# Patient Record
Sex: MALE | Race: WHITE | URBAN - METROPOLITAN AREA
[De-identification: names, ages, dates, MRNs, and addresses within clinical notes are randomized per-mention and may not be internally consistent; named-entity substitution may affect disease eponyms.]

---

## 2024-02-13 ENCOUNTER — LAB ENCOUNTER (OUTPATIENT)
Dept: LAB | Age: 24
End: 2024-02-13
Attending: INTERNAL MEDICINE
Payer: COMMERCIAL

## 2024-02-13 ENCOUNTER — OFFICE VISIT (OUTPATIENT)
Dept: INTERNAL MEDICINE CLINIC | Facility: CLINIC | Age: 24
End: 2024-02-13
Payer: COMMERCIAL

## 2024-02-13 VITALS
OXYGEN SATURATION: 98 % | TEMPERATURE: 97 F | HEIGHT: 68 IN | SYSTOLIC BLOOD PRESSURE: 110 MMHG | WEIGHT: 198 LBS | HEART RATE: 75 BPM | DIASTOLIC BLOOD PRESSURE: 68 MMHG | BODY MASS INDEX: 30.01 KG/M2

## 2024-02-13 DIAGNOSIS — Z00.00 PHYSICAL EXAM: ICD-10-CM

## 2024-02-13 DIAGNOSIS — Z00.00 PHYSICAL EXAM: Primary | ICD-10-CM

## 2024-02-13 DIAGNOSIS — M51.9 LUMBAR DISC DISEASE: ICD-10-CM

## 2024-02-13 LAB
ALBUMIN SERPL-MCNC: 4.7 G/DL (ref 3.2–4.8)
ALBUMIN/GLOB SERPL: 1.7 {RATIO} (ref 1–2)
ALP LIVER SERPL-CCNC: 82 U/L
ALT SERPL-CCNC: 29 U/L
ANION GAP SERPL CALC-SCNC: 6 MMOL/L (ref 0–18)
AST SERPL-CCNC: 16 U/L (ref ?–34)
BASOPHILS # BLD AUTO: 0.01 X10(3) UL (ref 0–0.2)
BASOPHILS NFR BLD AUTO: 0.2 %
BILIRUB SERPL-MCNC: 0.5 MG/DL (ref 0.3–1.2)
BILIRUB UR QL: NEGATIVE
BUN BLD-MCNC: 14 MG/DL (ref 9–23)
BUN/CREAT SERPL: 14.4 (ref 10–20)
CALCIUM BLD-MCNC: 9.7 MG/DL (ref 8.7–10.4)
CHLORIDE SERPL-SCNC: 107 MMOL/L (ref 98–112)
CHOLEST SERPL-MCNC: 188 MG/DL (ref ?–200)
CO2 SERPL-SCNC: 29 MMOL/L (ref 21–32)
COLOR UR: YELLOW
CREAT BLD-MCNC: 0.97 MG/DL
DEPRECATED RDW RBC AUTO: 38.9 FL (ref 35.1–46.3)
EGFRCR SERPLBLD CKD-EPI 2021: 112 ML/MIN/1.73M2 (ref 60–?)
EOSINOPHIL # BLD AUTO: 0.08 X10(3) UL (ref 0–0.7)
EOSINOPHIL NFR BLD AUTO: 1.6 %
ERYTHROCYTE [DISTWIDTH] IN BLOOD BY AUTOMATED COUNT: 12.5 % (ref 11–15)
FASTING PATIENT LIPID ANSWER: YES
FASTING STATUS PATIENT QL REPORTED: YES
GLOBULIN PLAS-MCNC: 2.8 G/DL (ref 2.8–4.4)
GLUCOSE BLD-MCNC: 96 MG/DL (ref 70–99)
GLUCOSE UR-MCNC: NORMAL MG/DL
HCT VFR BLD AUTO: 48.8 %
HDLC SERPL-MCNC: 47 MG/DL (ref 40–59)
HGB BLD-MCNC: 16.3 G/DL
HGB UR QL STRIP.AUTO: NEGATIVE
IMM GRANULOCYTES # BLD AUTO: 0.03 X10(3) UL (ref 0–1)
IMM GRANULOCYTES NFR BLD: 0.6 %
KETONES UR-MCNC: NEGATIVE MG/DL
LDLC SERPL CALC-MCNC: 109 MG/DL (ref ?–100)
LEUKOCYTE ESTERASE UR QL STRIP.AUTO: NEGATIVE
LYMPHOCYTES # BLD AUTO: 1.92 X10(3) UL (ref 1–4)
LYMPHOCYTES NFR BLD AUTO: 38.2 %
MCH RBC QN AUTO: 28.7 PG (ref 26–34)
MCHC RBC AUTO-ENTMCNC: 33.4 G/DL (ref 31–37)
MCV RBC AUTO: 86.1 FL
MONOCYTES # BLD AUTO: 0.3 X10(3) UL (ref 0.1–1)
MONOCYTES NFR BLD AUTO: 6 %
NEUTROPHILS # BLD AUTO: 2.69 X10 (3) UL (ref 1.5–7.7)
NEUTROPHILS # BLD AUTO: 2.69 X10(3) UL (ref 1.5–7.7)
NEUTROPHILS NFR BLD AUTO: 53.4 %
NITRITE UR QL STRIP.AUTO: NEGATIVE
NONHDLC SERPL-MCNC: 141 MG/DL (ref ?–130)
OSMOLALITY SERPL CALC.SUM OF ELEC: 294 MOSM/KG (ref 275–295)
PH UR: 5.5 [PH] (ref 5–8)
PLATELET # BLD AUTO: 271 10(3)UL (ref 150–450)
POTASSIUM SERPL-SCNC: 4.6 MMOL/L (ref 3.5–5.1)
PROT SERPL-MCNC: 7.5 G/DL (ref 5.7–8.2)
RBC # BLD AUTO: 5.67 X10(6)UL
SODIUM SERPL-SCNC: 142 MMOL/L (ref 136–145)
SP GR UR STRIP: >1.03 (ref 1–1.03)
TRIGL SERPL-MCNC: 186 MG/DL (ref 30–149)
TSI SER-ACNC: 0.74 MIU/ML (ref 0.55–4.78)
UROBILINOGEN UR STRIP-ACNC: NORMAL
VIT D+METAB SERPL-MCNC: 11.2 NG/ML (ref 30–100)
VLDLC SERPL CALC-MCNC: 32 MG/DL (ref 0–30)
WBC # BLD AUTO: 5 X10(3) UL (ref 4–11)

## 2024-02-13 PROCEDURE — 80061 LIPID PANEL: CPT

## 2024-02-13 PROCEDURE — 80053 COMPREHEN METABOLIC PANEL: CPT

## 2024-02-13 PROCEDURE — 84443 ASSAY THYROID STIM HORMONE: CPT

## 2024-02-13 PROCEDURE — 82306 VITAMIN D 25 HYDROXY: CPT

## 2024-02-13 PROCEDURE — 36415 COLL VENOUS BLD VENIPUNCTURE: CPT

## 2024-02-13 PROCEDURE — 81001 URINALYSIS AUTO W/SCOPE: CPT | Performed by: INTERNAL MEDICINE

## 2024-02-13 PROCEDURE — 99385 PREV VISIT NEW AGE 18-39: CPT | Performed by: INTERNAL MEDICINE

## 2024-02-13 PROCEDURE — 84410 TESTOSTERONE BIOAVAILABLE: CPT

## 2024-02-13 PROCEDURE — 85025 COMPLETE CBC W/AUTO DIFF WBC: CPT

## 2024-02-13 NOTE — PATIENT INSTRUCTIONS
1. Physical exam  Physical exam instruction: Improve diet and exercise, complete fasting labs in the near future and you will be called with results 5-7 days after completed, call with questions.  Call the central scheduling number at [phone] to schedule at any of the [facility] locations    - CBC With Differential With Platelet; Future  - Comp Metabolic Panel (14); Future  - Testosterone,Total and Weakly Bound w/ SHBG; Future  - TSH W Reflex To Free T4; Future  - Urinalysis with Culture Reflex  - Vitamin D; Future  - Lipid Panel; Future    2. Lumbar disc disease  Cont monitoring and management, I like the idea of the chiropractor you have planned, lets go and see them, lets mention to them that Anastasiia exercises, I do think something with extension based therapy, should help you in a positive direction  I also like the idea of you getting an appointment with Dr. Lloyd Mack or one of his younger associates to get established with your low back issues, they can certainly offer you some help here especially if something worsens.  - Chiropractic Referral - In Network  - Physiatry Referral - In Network     Finasteride Pregnancy And Lactation Text: This medication is absolutely contraindicated during pregnancy. It is unknown if it is excreted in breast milk.

## 2024-02-13 NOTE — PROGRESS NOTES
Kimani Ambrosio is a 23 year old male who presents for a complete physical exam.   HPI:   Pt complains of:    Chief Complaint   Patient presents with    Physical     Referral for chiropractor, pended (bulging disc).   Patient is here today for physical exam, patient is up-to-date with age-related standard of care screening and vaccination recommendations, this was discussed at length and they verbalized understanding of any deficiencies.    Low back pain: Patient has a moderately long history of low back pain, chronic type low back pain, nonradiating, that he has recently engaged in physical therapy and chiropractics for, he has gained some weight, has recently completed MRI through a specialist that showed disc bulging L5-S1 area.    Weight gain: Patient recently noted himself to be gaining weight, bought a Peloton and has been riding that for the past month, his weight is back down at least 15 pounds since then he claims.    Wt Readings from Last 3 Encounters:   02/13/24 198 lb (89.8 kg)       No current outpatient medications on file.      Past Medical History:   Diagnosis Date    Allergic rhinitis       History reviewed. No pertinent surgical history.   History reviewed. No pertinent family history.   Social History:  Social History     Socioeconomic History    Marital status: Single   Tobacco Use    Smoking status: Never    Smokeless tobacco: Never   Vaping Use    Vaping Use: Never used   Substance and Sexual Activity    Alcohol use: Yes     Comment: socially    Drug use: Never         REVIEW OF SYSTEMS:   REVIEW OF SYSTEMS:  Constitutional: Negative for Chills, fatigue, fever, malaise, weight gain and weight loss.  ENMT: Negative for Nasal drainage and sinus pressure.  Eyes: Negative for Vision changes.  Respiratory: Negative for Cough, dyspnea and wheezing.  Cardio: Negative Chest pain and irregular heartbeat/palpitations.  GI: Negative for Abdominal pain, constipation, diarrhea, heartburn, nausea and  vomiting.  : Negative for Dysuria and urinary frequency.  Endocrine: Negative for Cold intolerance and heat intolerance.  Neuro: Negative for Gait disturbance and memory impairment.  Psych: Negative for Anxiety and depression.  Integumentary: Negative for Hives and rash.  MS: Negative muscle weakness. negative for joint pain  Hema/Lymph: Negative Easy bleeding and easy bruising.  Allergic/Immuno: Negative Environmental allergies and food allergies.    EXAM:   /68   Pulse 75   Temp 97.2 °F (36.2 °C) (Tympanic)   Ht 5' 8\" (1.727 m)   Wt 198 lb (89.8 kg)   SpO2 98%   BMI 30.11 kg/m²   Body mass index is 30.11 kg/m².   PHYSICAL EXAMINATION:  Vital signs: See chart   Gen. exam: Alert and oriented, in no acute distress   HEENT: Pupils equal and reactive to light and accommodation, moist mucous membranes  Neck exam:  Supple with baseline range of motion.  Normal thyroid trachea midline, no JVD  Heart exam: Regular rate and rhythm no murmurs no S3 no S4   Lung exam: No rales no rhonchi no wheezes  Abdominal exam: Soft nontender nondistended positive bowel sounds are normoactive  Extremities exam: no clubbing no cyanosis no edema  Skin exam: No obvious wounds, no rashes  Neurological exam: Cranial nerves II through XII intact, no gross deficits  Musculoskeletal exam: no Arthritis appreciated, no obvious deformity  : prostate not examined    ASSESSMENT AND PLAN:   Kimani Ambrosio is a 23 year old male who presents for a complete physical exam.  1. Physical exam  Physical exam instruction: Improve diet and exercise, complete fasting labs in the near future and you will be called with results 5-7 days after completed, call with questions.  Call the central scheduling number at [phone] to schedule at any of the [facility] locations    - CBC With Differential With Platelet; Future  - Comp Metabolic Panel (14); Future  - Testosterone,Total and Weakly Bound w/ SHBG; Future  - TSH W Reflex To Free T4; Future  -  Urinalysis with Culture Reflex  - Vitamin D; Future  - Lipid Panel; Future    2. Lumbar disc disease  Cont monitoring and management, I like the idea of the chiropractor you have planned, lets go and see them, lets mention to them that Anastasiia exercises, I do think something with extension based therapy, should help you in a positive direction  I also like the idea of you getting an appointment with Dr. Lloyd Mack or one of his younger associates to get established with your low back issues, they can certainly offer you some help here especially if something worsens.  - Chiropractic Referral - In Network  - Physiatry Referral - In Network    Spent 45 minutes obtaining history, evaluating patient, discussing treatment options, diet, exercise, review of available labs and radiology reports, and completing documentation.    Jeff Anthony D'Amico, DO  2/13/2024  3:01 PM

## 2024-02-20 LAB
SEX HORM BIND GLOB: 24.8 NMOL/L
TESTOST % FREE+WEAK BND: 43.4 %
TESTOST FREE+WEAK BND: 238.1 NG/DL
TESTOSTERONE TOT /MS: 548.7 NG/DL

## 2024-02-21 ENCOUNTER — PATIENT MESSAGE (OUTPATIENT)
Dept: INTERNAL MEDICINE CLINIC | Facility: CLINIC | Age: 24
End: 2024-02-21

## 2024-02-21 ENCOUNTER — TELEPHONE (OUTPATIENT)
Dept: INTERNAL MEDICINE CLINIC | Facility: CLINIC | Age: 24
End: 2024-02-21

## 2024-02-21 NOTE — TELEPHONE ENCOUNTER
From: Kimani Ambrosio  To: Jeff Anthony D'Amico  Sent: 2/21/2024 1:37 PM CST  Subject: Test Results     Hey Doctor, I just saw the test results and everything looked fine. I did notice that some results were high up and I wanted to ask if those were anything that I Need to worry about. Also some deficiency in some results

## 2024-02-21 NOTE — TELEPHONE ENCOUNTER
----- Message from Kimani Ambrosio sent at 2/21/2024  1:37 PM CST -----  Regarding: Test Results   Contact: [phone]  Hey Doctor, I just saw the test results and everything looked fine. I did notice that some results were high up and I wanted to ask if those were anything that I Need to worry about. Also some deficiency in some results

## 2024-03-13 ENCOUNTER — OFFICE VISIT (OUTPATIENT)
Dept: PHYSICAL MEDICINE AND REHAB | Facility: CLINIC | Age: 24
End: 2024-03-13
Payer: COMMERCIAL

## 2024-03-13 VITALS — BODY MASS INDEX: 30 KG/M2 | OXYGEN SATURATION: 98 % | HEART RATE: 81 BPM | WEIGHT: 195 LBS

## 2024-03-13 DIAGNOSIS — M47.816 LUMBAR SPONDYLOSIS: Primary | ICD-10-CM

## 2024-03-13 PROCEDURE — 99203 OFFICE O/P NEW LOW 30 MIN: CPT | Performed by: PHYSICAL MEDICINE & REHABILITATION

## 2024-03-13 NOTE — PROGRESS NOTES
[facility]  Progress Note    CHIEF COMPLAINT:    Chief Complaint   Patient presents with    New Patient     New R handed patient is here for midline and R side low back pain. Denies injury. He's had the pain for about 3 years. PT 3 years ago with no improvement. MRI in 01/2024 at [facility]. No injections. Denies t/n. Intermittent burning in low back. No pain meds. Sitting and standing for a long period of time makes pain worse. Pain 6/10.        History of Present Illness:  Kimani Ambrosio is a 24 year old male who is being seen in consultation at the request of Dr. Jeff D'Amico.  He has a chief complaint of constant right-sided low back pain worse with sitting and standing. Stretching helps.  Dr. D'Amico evaluated him and prescribed steroids, but he didn't take them.  Pain is 6/10.  He has had chiropractic care- no adjustments.  He also had a lumbar MRI  at [facility] showing an L5-S1 disc bulge, but the films are not available.  There is no radiation into the legs, but there are burning dysesthesias over the low back.  No bowel or bladder incontinence.      PAST MEDICAL HISTORY:  Past Medical History:   Diagnosis Date    Allergic rhinitis        SURGICAL HISTORY:  No past surgical history on file.    SOCIAL HISTORY:   Social History     Occupational History    Not on file   Tobacco Use    Smoking status: Never    Smokeless tobacco: Never   Vaping Use    Vaping Use: Never used   Substance and Sexual Activity    Alcohol use: Yes     Comment: socially    Drug use: Never    Sexual activity: Not on file       CURRENT MEDICATIONS:   No current outpatient medications on file.       ALLERGIES:   No Known Allergies    REVIEW OF SYSTEMS:   Patient-reported ROS  Constitutional  Sleep Disturbance: denies  Chills: denies  Fever: denies  Weight Gain: denies  Weight Loss: denies   Cardiovascular  Chest Pain: denies   Respiratory  Painful Breathing: denies  Wheezing: denies    Gastrointestinal  Bowel Incontinence: denies  Heartburn: denies  Abdominal Pain: denies  Blood in Stool : denies  Rectal Pain: denies   Hematology  Easy Bruising: denies  Easy Bleeding: denies   Genitourinary  Difficulty Urinating: denies  Bladder Incontinence: denies  Pelvic Pain: denies  Painful Urination: denies   Musculoskeletal  Joint Stiffness: denies  Painful Joints: denies  Tailbone Pain: denies  Swollen Joints: denies   Peripheral Vascular  Swelling of Legs/Feet: denies  Cold Extremities: denies   Skin  Open Sores: denies  Nodules or Lumps: denies  Rash: denies   Neurological  Loss of Strength Since last Visit: denies  Tingling/Numbness: denies  Balance: denies   Psychiatric  Anxiety: denies  Depressed Mood: denies             PHYSICAL EXAM:   Pulse 81   Wt 195 lb (88.5 kg)   SpO2 98%   BMI 29.65 kg/m²     Body mass index is 29.65 kg/m².      General: No immediate distress  Head: Normocephalic/ Atraumatic  Extremities: No lower extremity edema bilaterally. Peripheral pulses intact.   Spine: Pain recreated with endrange lumbar extension, rotation in combination with extension and sidebending.  Range of motion is fully preserved however.  Hips: full and painfree ROM   Neuro:   Cognition: alert & oriented x 3, attentive, able to follow 2 step commands, comprehention intact, spontaneous speech intact  Strength: Lower extremities have 5/5 strength  Sensation: Normal lower extremities  Reflexes: Normal lower extremities  SLR: neg    Data    Radiology Imaging:  [facility] a lumbar MRI report from January 2024 indicates all levels normal except for a disc bulge at L5-S1 toward the left, mild left foraminal narrowing.  No spinal canal narrowing.      ASSESSMENT AND PLAN:  1. Lumbar spondylosis  He seems to have facet syndrome.  I recommend he bring in his MRI.  I set him up for flexion-based PT.  I recommended over-the-counter NSAIDs.  Return in 4 weeks  - PHYSICAL THERAPY - INTERNAL          The  patient was in agreement with the assessment and plan.  All questions were answered.        José Luis Mack MD  Physical Medicine and Rehabilitation/Sports Medicine  [facility]

## 2024-03-14 ENCOUNTER — TELEPHONE (OUTPATIENT)
Dept: PHYSICAL THERAPY | Facility: HOSPITAL | Age: 24
End: 2024-03-14

## 2024-03-14 PROBLEM — M26.629 TMJ SYNDROME: Status: ACTIVE | Noted: 2020-06-09

## 2024-05-13 ENCOUNTER — TELEPHONE (OUTPATIENT)
Dept: PHYSICAL THERAPY | Facility: HOSPITAL | Age: 24
End: 2024-05-13

## 2024-05-14 ENCOUNTER — OFFICE VISIT (OUTPATIENT)
Dept: PHYSICAL THERAPY | Facility: HOSPITAL | Age: 24
End: 2024-05-14
Attending: PHYSICAL MEDICINE & REHABILITATION
Payer: COMMERCIAL

## 2024-05-14 DIAGNOSIS — M47.816 LUMBAR SPONDYLOSIS: Primary | ICD-10-CM

## 2024-05-14 PROCEDURE — 97110 THERAPEUTIC EXERCISES: CPT

## 2024-05-14 PROCEDURE — 97161 PT EVAL LOW COMPLEX 20 MIN: CPT

## 2024-05-14 NOTE — PROGRESS NOTES
LUMBAR SPINE EVALUATION:   Kimani Ambrosio    3/14/2000  Diagnosis: Lumbar spondylosis (M47.816)   Referring Physician:  José Luis Cramer MD visit: none  Initial Evaluation Date: 5/14/2024  Date of Surgery: None for this diagnosis  Insurance: [employer]  Authorized # of visits:  8 by 3/13/2025  Plan of care to: 8/12/2024    Precautions/Hx: allergies seasonal    Past medical history was reviewed with Kimani.   Past Medical History:    Allergic rhinitis     No past surgical history on file.    PATIENT SUMMARY   Kimani Ambrosio is a 24 year old y/o male who presents to therapy today with complaints of central LBP, with occasional radiation of pain into the L gluteals   History of current condition: 4 years ago lifting for work, 4 months ago picked up his pillai  and was not able to walk for 1 hour afterwards.   Current functional limitations include, but are not limited to standing, lifting, reaching down, sitting to drive.   Prior treatments: PT 4 years ago, did not help  Recent accidents or falls: benton Harman describes prior level of function as able to perform all desired ADL's without difficulty. Pt goals included as physical therapy goals below.     ASSESSMENT:   Pt presents with subjective complaints and functional limitations as noted above.  Pt's function and objective finding are consistent with physician's diagnosis.  The results of the objective tests and measures demonstrate the following limitations: min loss of lumbar ROM - flex w pain, L SB and L rotation; mild 5-/5 L ankle PF; decreased hams flexibility; and less myofascial mobility in L SLR that is (-) for neuro sx reproduction.     Pt and PT discussed evaluation findings, pathology, POC and HEP.  Pt voiced understanding and performs HEP correctly without reported pain. Skilled Physical Therapy is medically necessary to address the above impairments and reach functional goals.    In agreement with functional score and clinical rationale,  this evaluation involved Low Complexity decision making .     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  Incontinence: no  Saddle Anesthesia: no  GI: no  Stress: min  Work:  DJ  Living environment: house, partner and 4yo  Stairs: 1 steps in, flight to basement  Unexplained wt loss: no  Increased pain w coughing, sneezing, straining in the bathroom: no  Blood thinners: no  Diabetes: no  Latex Allergy: no  Pacemaker: no       OBJECTIVE:     Objective Initial Evaluation Data 5/14/2024:    Oswestry Disability Index Score  Score: 18 % (5/14/2024  9:15 AM)     Gait:        Deviations: wnl       Devices: none       Assistance: none      Posture 5/14/2024   Alignment Min B pronation, min ant pelvic tilt, flattened spine, R trunk rotation, R shldr inf, L trunk shift       Sensation 5/14/2024   Dermatomes Light Touch    Proximal medial thigh R (L2)  wnl   Proximal medial thigh L (L2) wnl   Above knee R (L3) wnl   Above knee L (L3) wnl   Medial arch R(L4) wnl   Medial arch L (L4) wnl   Between 1st - 2nd toes R (L5) wnl   Between 1st - 2nd toes L (L5) wnl   Lateral border of foot R (S1) wnl   Lateral border of foot L (S1) wnl   Popliteal fossa R (S2) wnl   Popliteal fossa L(S2) wnl       ROM Lumbar 5/14/2024   Flexion Min *   Extension Min, loss of upper lumbar and lower T   R SB wnl   L SB Min loss in lower T   R Rotation wnl   L Rotation min   * pain limiting    ROM Hip 5/14/2024   Flex R 125 133   Flex L 125 125   ER R 45 53   ER L 45 57   IR R 40 35   IR L 40 26   *pain limiting     MMT 5/5 5/14/2024   L2- hip flex R 5   Hip flex L 5   L3- knee ext R 5   Knee ext L 5   L4- ankle DF R 5   Ankle DF L 5   L5- EHL R 5    EHL L 5   S1- ankle PF R 5   Ankle PF L 5-   S2- Hams R 5   Hams L 5   *pain limiting    LE flexibility 5/14/2024   Piriformis R wnl   Piriformis L wnl   Hams R -37   Hams L -30   *pain limiting    Neural mobility 5/14/2024   SLR R (-) 90 deg   SLR L (-) myofascial at 70 deg      HANK 5/14/2024   R wnl   L wnl        Imaging:     PROCEDURE: MRI LUMBAR SPINE WO CONTRAST     HISTORY:Radiculopathy     COMPARISON:None     TECHNIQUE: Sagittal and axial T1 and T2 images of the lumbar spine were acquired.  A sagittal STIR sequence was also obtained.   Some sequences are degraded by motion.     FINDINGS: There is straightening of the lumbar lordosis.  Lumbar vertebra are of normal height.  There are scattered small Schmorl's nodes.  No worrisome areas of marrow signal abnormality are appreciated.  The disc at L5-S1 is desiccated with moderate loss of height.  The distal spinal cord and conus are within normal limits.     T12-L1: There is no spinal canal or foraminal narrowing.     L1-2: There is no spinal canal or foraminal narrowing.     L2-3: There is no spinal canal or foraminal narrowing.     L3-4: There is no spinal canal or foraminal narrowing.     L4-5: There is no spinal canal or foraminal narrowing.     L5-S1: There is a disc bulge, mildly asymmetrically more prominent on the left.  There is no significant spinal canal narrowing.  There is mild left and no right foraminal narrowing.     IMPRESSION: There are mild degenerative findings at L5-S1 as detailed above.     FINAL REPORT   Attending Radiologist:  Payton Mlils MD   Date Signed Off:  01/23/2024 06:45       Treatment performed this date:    Therapeutic Exercise:  Visit #   1/8   Position Exercise HEP 5/14/2024   Supine Sciatic nerve glides H X 10x   Prone Lying   X     On elbows  X 1 min    Press ups H X 10x   Sitting Sciatic nerve glides H X 10x   Standing Trunk extn to wall  X 10x    Heel raises H X 10x   Neuro Re-ed  X Pt educated on disc anatomy  and behavior with use of discussion, diagrams, models and demonstration of positioning for neutral control.  Pt asked clarifying questions and verbalized understanding of concepts and application.     H = HEP. Pt given copies of this exercise for home program.  X = Exercises done this date - pt verbalized understanding and demonstrated competence. All exercises done B unless otherwise indicated.   Pt advised to discontinue exercises that increase pain and to call or return to therapist to discuss.  Each intervention above is specifically prescribed to address the patient's identified impairments, activity limitations, and participation restrictions.    ASSESSMENT:     Physical Therapy Goals: From 5/14/2024 to 8/12/2024  - Created with patient input during initial evaluation   1. Pt will be independent in beginning level of HEP for stretching, posture and strengthening.   2. Pt will be able to lift 40# without increased symptoms.  3. Pt will be able to bending to reach objects on the floor without increased symptoms.  4. Pt will be able to sit to drive 1 hour without increased symptoms.  5. Pt will be able to standing for 2 hours for work without increased symptoms.     PLAN OF CARE:      Frequency / Duration: Patient will be seen for 1-2x/week decreasing to every other week for a total of 18 visits over a 90 day period for therapeutic exercises, posture retraining, therapeutic activities, manual treatment, neuromuscular reeducation, therapeutic pain neuroscience education, patient education, modalities as needed, home exercise program.    Education or treatment limitation: None  Rehab Potential:good    Patient was advised of these findings, precautions, goals of treatment, plan of care, beginning self care and treatment options and has agreed to actively participate in planning and for this course of care. Pt educated on possible soreness after evaluation w use of modalities as needed (ice/heat), postural  corrections and the importance of staying active.    Charges: PT Eval 1, TE 1     Total Timed treatment: 12 min      Total Treatment Time: 55 min    Thank you for your referral. Please co-sign or sign and return this letter via fax as soon as possible to [phone]. If you have any questions, please contact me at Dept: [phone]    Sincerely,  Electronically signed by therapist: Juana Cherry PT    Physician's certification required: Yes  I certify the need for these services furnished under this plan of treatment and while under my care.    X___________________________________________________ Date____________________    Certification From: 5/14/2024  To:8/12/2024        __________________________________________________________________________________________      21st Century Cures Act Notice to Patient: Medical documents like this are made available to patients in the interest of transparency. However, be advised this is a medical document and it is intended as [id number]-ym-[id number] communication between your medical providers. This medical document may contain abbreviations, assessments, medical data, and results or other terms that are unfamiliar. Medical documents are intended to carry relevant information, facts as evident, and the clinical opinion of the practitioner. As such, this medical document may be written in language that appears blunt or direct. You are encouraged to contact your medical provider and/or [facility] Patient Experience if you have any questions about this medical document.

## 2024-05-21 ENCOUNTER — OFFICE VISIT (OUTPATIENT)
Dept: PHYSICAL THERAPY | Facility: HOSPITAL | Age: 24
End: 2024-05-21
Attending: PHYSICAL MEDICINE & REHABILITATION
Payer: COMMERCIAL

## 2024-05-21 PROCEDURE — 97140 MANUAL THERAPY 1/> REGIONS: CPT

## 2024-05-21 PROCEDURE — 97112 NEUROMUSCULAR REEDUCATION: CPT

## 2024-05-21 PROCEDURE — 97110 THERAPEUTIC EXERCISES: CPT

## 2024-05-21 NOTE — PROGRESS NOTES
Kimani Ambrosio    3/14/2000  Diagnosis: Lumbar spondylosis (M47.816)   Referring Physician:  José Luis Cramer MD visit: none  Initial Evaluation Date: 5/14/2024  Date of Surgery: None for this diagnosis  Insurance: [employer]  Authorized # of visits:  8 by 3/13/2025  Plan of care to: 8/12/2024    Precautions/Hx: allergies seasonal    SUBJECTIVE:     Pt reports that he felt better after the evaluation and notes that the back is not as tight.      Visit count 1/8 2/8    Date 5/14/2024 5/21/2024     LBP      Pain Range 4 to 7/10 4-7/10    Pain Ave 4/10 4/10    Pain Current 4/10 5/10         OBJECTIVE:       Treatment performed this date:    Therapeutic Exercise:  Visit #   1/8 2/8   Position Exercise HEP 5/14/2024 5/21/2024    Supine Sciatic nerve glides H X 10x    Prone Lying   X      On elbows  X 1 min     Press ups H X 10x X 10x   Sitting Sciatic nerve glides H X 10x X 10x    Hesch lumbar self mob H  X 10x3    Standing Trunk extn to wall  X 10x X 10x    Heel raises H X 10x X 10x   Neuro Re-ed  X Pt educated on disc anatomy and behavior with use of discussion, diagrams, models and demonstration of positioning for neutral control.  Pt asked clarifying questions and verbalized understanding of concepts and application.   X see assessment    Man MET   X sacrum clear, L5, L4 ERSL;    H = HEP. Pt given copies of this exercise for home program.  X = Exercises done this date - pt verbalized understanding and demonstrated competence. All exercises done B unless otherwise indicated.   Pt advised to discontinue exercises that increase pain and to call or return to therapist to discuss.  Each intervention above is specifically prescribed to address the patient's identified impairments, activity limitations, and participation restrictions.    ASSESSMENT:     Pt had mild increase in min pain post evaluation. Pt educated on mobility and postural deficits w review of imaging to reinforce corrective exercises and  positional awareness. Pt educated on facet joint anatomy and importance of balance function of interspinales to allow normal spinal mobility.  Pt verbalized understanding.   Pt demonstrates segmental mobility limitations as noted above and tolerated manual therapy with >50% improvement in mobility after treatment.  Pt advised that man tx goal is to allow improved and ongoing mobility and strength.  Pt instructed again to discontinue any exercise that might be increasing symptoms and return to PT to discuss and modified     Physical Therapy Goals: From 5/14/2024 to 8/12/2024  - Created with patient input during initial evaluation   1. Pt will be independent in beginning level of HEP for stretching, posture and strengthening.   2. Pt will be able to lift 40# without increased symptoms.  3. Pt will be able to bending to reach objects on the floor without increased symptoms.  4. Pt will be able to sit to drive 1 hour without increased symptoms.  5. Pt will be able to standing for 2 hours for work without increased symptoms.     PLAN OF CARE:      Continue PT per original plan for therapeutic exercises, posture retraining, therapeutic activities, manual treatment, neuromuscular reeducation, therapeutic pain neuroscience education, patient education, self care home management, home exercise program, and modalities as needed.    Charged Units Units Minutes   Ther Ex 1 12   Neuro 1 10   Ther Activity     Gait     Man Tx 2 23        Total Timed  45   Total Tx Time  45           __________________________________________________________________________________________      21st Century Cures Act Notice to Patient: Medical documents like this are made available to patients in the interest of transparency. However, be advised this is a medical document and it is intended as ebyd-ka-yffz communication between your medical providers. This medical document may contain abbreviations, assessments, medical data, and results or other  terms that are unfamiliar. Medical documents are intended to carry relevant information, facts as evident, and the clinical opinion of the practitioner. As such, this medical document may be written in language that appears blunt or direct. You are encouraged to contact your medical provider and/or [facility] Patient Experience if you have any questions about this medical document.    Objective Initial Evaluation Data 5/14/2024:    Oswestry Disability Index Score  Score: 18 % (5/14/2024  9:15 AM)     Gait:        Deviations: wnl       Devices: none       Assistance: none      Posture 5/14/2024   Alignment Min B pronation, min ant pelvic tilt, flattened spine, R trunk rotation, R shldr inf, L trunk shift       Sensation 5/14/2024   Dermatomes Light Touch    Proximal medial thigh R (L2)  wnl   Proximal medial thigh L (L2) wnl   Above knee R (L3) wnl   Above knee L (L3) wnl   Medial arch R(L4) wnl   Medial arch L (L4) wnl   Between 1st - 2nd toes R (L5) wnl   Between 1st - 2nd toes L (L5) wnl   Lateral border of foot R (S1) wnl   Lateral border of foot L (S1) wnl   Popliteal fossa R (S2) wnl   Popliteal fossa L(S2) wnl       ROM Lumbar 5/14/2024   Flexion Min *   Extension Min, loss of upper lumbar and lower T   R SB wnl   L SB Min loss in lower T   R Rotation wnl   L Rotation min   * pain limiting    ROM Hip 5/14/2024   Flex R 125 133   Flex L 125 125   ER R 45 53   ER L 45 57   IR R 40 35   IR L 40 26   *pain limiting     MMT 5/5 5/14/2024   L2- hip flex R 5   Hip flex L 5   L3- knee ext R 5   Knee ext L 5   L4- ankle DF R 5   Ankle DF L 5   L5- EHL R 5   EHL L 5   S1- ankle PF R 5   Ankle PF L 5-   S2- Hams R 5   Hams L 5   *pain limiting    LE flexibility 5/14/2024   Piriformis R wnl   Piriformis L wnl   Hams R -37   Hams L -30   *pain limiting    Neural mobility 5/14/2024   SLR R (-) 90 deg   SLR L (-) myofascial at 70 deg      HANK 5/14/2024   R wnl   L wnl        Imaging:     PROCEDURE: MRI LUMBAR  SPINE WO CONTRAST     HISTORY:Radiculopathy     COMPARISON:None     TECHNIQUE: Sagittal and axial T1 and T2 images of the lumbar spine were acquired.  A sagittal STIR sequence was also obtained.   Some sequences are degraded by motion.     FINDINGS: There is straightening of the lumbar lordosis.  Lumbar vertebra are of normal height.  There are scattered small Schmorl's nodes.  No worrisome areas of marrow signal abnormality are appreciated.  The disc at L5-S1 is desiccated with moderate loss of height.  The distal spinal cord and conus are within normal limits.     T12-L1: There is no spinal canal or foraminal narrowing.     L1-2: There is no spinal canal or foraminal narrowing.     L2-3: There is no spinal canal or foraminal narrowing.     L3-4: There is no spinal canal or foraminal narrowing.     L4-5: There is no spinal canal or foraminal narrowing.     L5-S1: There is a disc bulge, mildly asymmetrically more prominent on the left.  There is no significant spinal canal narrowing.  There is mild left and no right foraminal narrowing.     IMPRESSION: There are mild degenerative findings at L5-S1 as detailed above.     FINAL REPORT   Attending Radiologist:  Payton Mills MD   Date Signed Off:  01/23/2024 06:45

## 2024-05-28 ENCOUNTER — OFFICE VISIT (OUTPATIENT)
Dept: PHYSICAL THERAPY | Facility: HOSPITAL | Age: 24
End: 2024-05-28
Attending: PHYSICAL MEDICINE & REHABILITATION
Payer: COMMERCIAL

## 2024-05-28 PROCEDURE — 97112 NEUROMUSCULAR REEDUCATION: CPT

## 2024-05-28 PROCEDURE — 97110 THERAPEUTIC EXERCISES: CPT

## 2024-05-28 PROCEDURE — 97530 THERAPEUTIC ACTIVITIES: CPT

## 2024-05-28 NOTE — PROGRESS NOTES
Kimani Ambrosio    3/14/2000  Diagnosis: Lumbar spondylosis (M47.816)   Referring Physician:  José Luis Cramer MD visit: none  Initial Evaluation Date: 5/14/2024  Date of Surgery: None for this diagnosis  Insurance: [employer]  Authorized # of visits:  8 by 3/13/2025  Plan of care to: 8/12/2024    Precautions/Hx: allergies seasonal    SUBJECTIVE:     Pt reports that he felt better after the last visit and was able to move more.  He feels that he is reaching w less pain.      Visit count 1/8 2/8 3/8   Date 5/14/2024 5/21/2024 5/28/2024    LBP      Pain Range 4 to 7/10 4-7/10 4-7/10   Pain Ave 4/10 4/10 4/10   Pain Current 4/10 5/10 5/10        OBJECTIVE:       Treatment performed this date:    Therapeutic Exercise:  Visit #   1/8 2/8 3/8   Position Exercise HEP 5/14/2024 5/21/2024 5/28/2024    Supine Sciatic nerve glides H X 10x     Prone Lying   X       On elbows  X 1 min      Press ups H X 10x X 10x X 10x    Press ups w sheet self hold    10x   Sitting Sciatic nerve glides H X 10x X 10x 10x    Hesch lumbar self mob H  X 10x3     Standing Trunk extn to wall  X 10x X 10x 10x    Heel raises H X 10x X 10x     Lifting from waist ht    X 0# 7x, 20# 5x, 40# 3x    Lifting from floor laundry basket    X 20x 2x, 40# 3x           Neuro Re-ed  X Pt educated on disc anatomy and behavior with use of discussion, diagrams, models and demonstration of positioning for neutral control.  Pt asked clarifying questions and verbalized understanding of concepts and application.   X see assessment  X self protection and healing from condition w use of lumbar rolls. Anastasiia SuperRoll   Ther Act Function    X lifting for work and as parent.   Man MET   X sacrum clear, L5, L4 ERSL;     H = HEP. Pt given copies of this exercise for home program.  X = Exercises done this date - pt verbalized understanding and demonstrated competence. All exercises done B unless otherwise indicated.   Pt advised to discontinue exercises that increase  pain and to call or return to therapist to discuss.  Each intervention above is specifically prescribed to address the patient's identified impairments, activity limitations, and participation restrictions.    ASSESSMENT:     Pt notes improved function, but continued pain levels. Patient instructed in body mechanics for squat lift and golfer's lift. Pt started by lifting a pillow from 24\" height then from the floor and practiced pivot turns. Instructed in the importance of engaging core w exhale prior to lift. Pt instructed in use of lumbar rolls with trial of five different lumbar rolls.  Pt given information on where to purchase locally and online. Pt progressed quickly to tolerate lifting a basket w weight.  Discussed specifics of lifting his child. Pt will continue to benefit from PT intervention to achieve goals.      Physical Therapy Goals: From 5/14/2024 to 8/12/2024  - Created with patient input during initial evaluation   1. Pt will be independent in beginning level of HEP for stretching, posture and strengthening.   2. Pt will be able to lift 40# without increased symptoms.  3. Pt will be able to bending to reach objects on the floor without increased symptoms.  4. Pt will be able to sit to drive 1 hour without increased symptoms.  5. Pt will be able to standing for 2 hours for work without increased symptoms.     PLAN OF CARE:      Continue PT per original plan for therapeutic exercises, posture retraining, therapeutic activities, manual treatment, neuromuscular reeducation, therapeutic pain neuroscience education, patient education, self care home management, home exercise program, and modalities as needed.    Charged Units Units Minutes   Ther Ex 2 23   Neuro 1 14   Ther Activity 1 8   Gait     Man Tx          Total Timed  45   Total Tx Time  45           __________________________________________________________________________________________      21st Century Cures Act Notice to Patient: Medical  documents like this are made available to patients in the interest of transparency. However, be advised this is a medical document and it is intended as vhys-[id number]-amzr communication between your medical providers. This medical document may contain abbreviations, assessments, medical data, and results or other terms that are unfamiliar. Medical documents are intended to carry relevant information, facts as evident, and the clinical opinion of the practitioner. As such, this medical document may be written in language that appears blunt or direct. You are encouraged to contact your medical provider and/or [facility] Patient Experience if you have any questions about this medical document.    Objective Initial Evaluation Data 5/14/2024:    Oswestry Disability Index Score  Score: 18 % (5/14/2024  9:15 AM)     Gait:        Deviations: wnl       Devices: none       Assistance: none      Posture 5/14/2024   Alignment Min B pronation, min ant pelvic tilt, flattened spine, R trunk rotation, R shldr inf, L trunk shift       Sensation 5/14/2024   Dermatomes Light Touch    Proximal medial thigh R (L2)  wnl   Proximal medial thigh L (L2) wnl   Above knee R (L3) wnl   Above knee L (L3) wnl   Medial arch R(L4) wnl   Medial arch L (L4) wnl   Between 1st - 2nd toes R (L5) wnl   Between 1st - 2nd toes L (L5) wnl   Lateral border of foot R (S1) wnl   Lateral border of foot L (S1) wnl   Popliteal fossa R (S2) wnl   Popliteal fossa L(S2) wnl       ROM Lumbar 5/14/2024   Flexion Min *   Extension Min, loss of upper lumbar and lower T   R SB wnl   L SB Min loss in lower T   R Rotation wnl   L Rotation min   * pain limiting    ROM Hip 5/14/2024   Flex R 125 133   Flex L 125 125   ER R 45 53   ER L 45 57   IR R 40 35   IR L 40 26   *pain limiting     MMT 5/5 5/14/2024   L2- hip flex R 5   Hip flex L 5   L3- knee ext R 5   Knee ext L 5   L4- ankle DF R 5   Ankle DF L 5   L5- EHL R 5   EHL L 5   S1- ankle PF R 5   Ankle PF L 5-    S2- Hams R 5   Hams L 5        5/28/2024    Hip abd R 5   Hip abd L 5   Hip extn R 5-   Hip extn L 5-   *pain limiting    LE flexibility 5/14/2024   Piriformis R wnl   Piriformis L wnl   Hams R -37   Hams L -30   *pain limiting    Neural mobility 5/14/2024   SLR R (-) 90 deg   SLR L (-) myofascial at 70 deg      HAKN 5/14/2024   R wnl   L wnl        Imaging:     PROCEDURE: MRI LUMBAR SPINE WO CONTRAST     HISTORY:Radiculopathy     COMPARISON:None     TECHNIQUE: Sagittal and axial T1 and T2 images of the lumbar spine were acquired.  A sagittal STIR sequence was also obtained.   Some sequences are degraded by motion.     FINDINGS: There is straightening of the lumbar lordosis.  Lumbar vertebra are of normal height.  There are scattered small Schmorl's nodes.  No worrisome areas of marrow signal abnormality are appreciated.  The disc at L5-S1 is desiccated with moderate loss of height.  The distal spinal cord and conus are within normal limits.     T12-L1: There is no spinal canal or foraminal narrowing.     L1-2: There is no spinal canal or foraminal narrowing.     L2-3: There is no spinal canal or foraminal narrowing.     L3-4: There is no spinal canal or foraminal narrowing.     L4-5: There is no spinal canal or foraminal narrowing.     L5-S1: There is a disc bulge, mildly asymmetrically more prominent on the left.  There is no significant spinal canal narrowing.  There is mild left and no right foraminal narrowing.     IMPRESSION: There are mild degenerative findings at L5-S1 as detailed above.     FINAL REPORT   Attending Radiologist:  Payton Mills MD   Date Signed Off:  01/23/2024 06:45

## 2024-06-04 ENCOUNTER — APPOINTMENT (OUTPATIENT)
Dept: PHYSICAL THERAPY | Facility: HOSPITAL | Age: 24
End: 2024-06-04
Attending: PHYSICAL MEDICINE & REHABILITATION
Payer: COMMERCIAL

## 2024-06-04 ENCOUNTER — TELEPHONE (OUTPATIENT)
Dept: PHYSICAL THERAPY | Facility: HOSPITAL | Age: 24
End: 2024-06-04

## 2024-06-11 ENCOUNTER — APPOINTMENT (OUTPATIENT)
Dept: PHYSICAL THERAPY | Facility: HOSPITAL | Age: 24
End: 2024-06-11
Attending: PHYSICAL MEDICINE & REHABILITATION
Payer: COMMERCIAL

## 2024-06-18 ENCOUNTER — APPOINTMENT (OUTPATIENT)
Dept: PHYSICAL THERAPY | Facility: HOSPITAL | Age: 24
End: 2024-06-18
Attending: PHYSICAL MEDICINE & REHABILITATION
Payer: COMMERCIAL

## 2024-06-25 ENCOUNTER — APPOINTMENT (OUTPATIENT)
Dept: PHYSICAL THERAPY | Facility: HOSPITAL | Age: 24
End: 2024-06-25
Attending: PHYSICAL MEDICINE & REHABILITATION
Payer: COMMERCIAL

## 2024-06-25 ENCOUNTER — TELEPHONE (OUTPATIENT)
Dept: PHYSICAL THERAPY | Facility: HOSPITAL | Age: 24
End: 2024-06-25

## 2024-07-02 ENCOUNTER — APPOINTMENT (OUTPATIENT)
Dept: PHYSICAL THERAPY | Facility: HOSPITAL | Age: 24
End: 2024-07-02
Attending: PHYSICAL MEDICINE & REHABILITATION
Payer: COMMERCIAL

## 2024-10-04 ENCOUNTER — TELEPHONE (OUTPATIENT)
Dept: INTERNAL MEDICINE CLINIC | Facility: CLINIC | Age: 24
End: 2024-10-04

## 2024-10-04 DIAGNOSIS — L60.0 INGROWING TOENAIL: ICD-10-CM

## 2024-10-04 DIAGNOSIS — L03.032 PARONYCHIA OF GREAT TOE, LEFT: Primary | ICD-10-CM

## 2024-10-04 RX ORDER — CEPHALEXIN 500 MG/1
500 CAPSULE ORAL 4 TIMES DAILY
Qty: 28 CAPSULE | Refills: 0 | Status: SHIPPED | OUTPATIENT
Start: 2024-10-04 | End: 2024-10-11

## 2024-10-04 NOTE — TELEPHONE ENCOUNTER
To Dr. CHU     Please Advise     Called patient to inquire more information on my chart message- scheduled appointment for 1/7/2025. Stated about a week ago he was cutting his nails, unsure if he cut too much but reports some swelling, pain and discoloration to corner of toe. Denies any drainage at this time. Advised patient to not wait until January to be seen for this as there is a potential for infection    Advised patient to be seen in Urgent care for further evaluation. Patient agreeable however would like to know if there is anything else Dr. Damico advises before going to Urgent care

## 2024-10-04 NOTE — TELEPHONE ENCOUNTER
[facility]  Appointment reason:      My toe is swollen and I think I have an ingrown nail not sure but it hurts and purple     Should patient wait until 1/7/25?

## 2024-10-04 NOTE — TELEPHONE ENCOUNTER
Virtual Visit/Telephone Note    Kimani Ambrosio verbally consents to a Virtual/Telephone Check-In service on 10/04/24  Patient understands and accepts financial responsibility for any deductible, co-insurance and/or co-pays associated with this service.    Duration/time spent of the service: 20 Minutes of direct patient contact.  10 Minutes of chart review, documentation, medical decision making.    HPI:   Kimani Ambrosio is a 24 year old male who presents for complains of:   Chief Complaint   Patient presents with    Appt Request     Toe Issue   Toe issue: Patient claims to have a lateral edge toenail issue with swelling on the skin edge, after cutting his nail too short approximately 4 to 5 days ago, since then is gone very inflamed, does have some mild serosanguineous discharge, and is tender to the touch, he has not had an ingrown history of toenails in the past, and does trim his nails himself.  Does not have a podiatrist    Physical exam:   Telephone visit only, no obvious pain no of shortness of breath patient sounds been his usual state of health.    ASSESSMENT AND PLAN:   Kimani Ambrosio is a 24 year old male who presents with the followin. Paronychia of great toe, left  I like the idea of using some antibiotics here from an oral perspective taking these 4 times a day for the next 7 days, if something is not turning the corner lets make sure we are in with the podiatry doctors to have a quick look year  I would like you to avoid soaking the area if possible or submerging in water, okay to use Neosporin and a Band-Aid over the top of it, soap and water wash is certainly fine as well  - Podiatry Referral - In Network  Denisa Thomas, DPELIZABET [address],  [place] [address] [phone]       2. Ingrowing toenail  As above, I do think this may be happening as an ingrown toenail here, if that is a question lets be in with the podiatrist to get you to a definitive solution here.  - Podiatry  Referral - In Network  - cephALEXin 500 MG Oral Cap; Take 1 capsule (500 mg total) by mouth 4 (four) times daily for 7 days.  Dispense: 28 capsule; Refill: 0    Jeff Anthony D'Amico, DO  10/4/2024  3:40 PM    Spent 30 minutes obtaining history, evaluating patient, discussing treatment options, diet, exercise, review of available labs and radiology reports, and completing documentation.

## 2024-11-07 ENCOUNTER — OFFICE VISIT (OUTPATIENT)
Dept: PODIATRY CLINIC | Facility: CLINIC | Age: 24
End: 2024-11-07

## 2024-11-07 DIAGNOSIS — L60.0 INGROWING TOENAIL: ICD-10-CM

## 2024-11-07 DIAGNOSIS — M79.675 PAIN OF TOE OF LEFT FOOT: ICD-10-CM

## 2024-11-07 DIAGNOSIS — L03.032 PARONYCHIA OF TOE OF LEFT FOOT: Primary | ICD-10-CM

## 2024-11-07 PROCEDURE — 11750 EXCISION NAIL&NAIL MATRIX: CPT | Performed by: PODIATRIST

## 2024-11-07 PROCEDURE — 99203 OFFICE O/P NEW LOW 30 MIN: CPT | Performed by: PODIATRIST

## 2024-11-07 RX ORDER — MUPIROCIN 20 MG/G
OINTMENT TOPICAL
Qty: 30 G | Refills: 0 | Status: SHIPPED | OUTPATIENT
Start: 2024-11-07

## 2024-11-11 VITALS — SYSTOLIC BLOOD PRESSURE: 124 MMHG | DIASTOLIC BLOOD PRESSURE: 70 MMHG

## 2024-11-11 NOTE — PROGRESS NOTES
Per Dr. Mendes verbal order, to draw up 5ml of 0.5% Marcaine for Left Hallux Lateal Border ingrown toenail procedure.    Dupixent Counseling: I discussed with the patient the risks of dupilumab including but not limited to eye infection and irritation, cold sores, injection site reactions, worsening of asthma, allergic reactions and increased risk of parasitic infection.  Live vaccines should be avoided while taking dupilumab. Dupilumab will also interact with certain medications such as warfarin and cyclosporine. The patient understands that monitoring is required and they must alert us or the primary physician if symptoms of infection or other concerning signs are noted.

## 2024-11-17 NOTE — PROGRESS NOTES
Kimani Ambrosio is a 24 year old male.   Chief Complaint   Patient presents with    Consult     Left hallux- ingrown toenail -  went to see pcp and prescribed medication- redness- patient denies pain.         HPI:   Patient presents with a chief complaint of a painful ingrown left great toenail it has been draining's been swollen and red.  At today's visit reviewed nurse's history as taken above, allergies medications and medical history as documented below.  All changes duly noted  Allergies: Patient has no known allergies.   Current Outpatient Medications   Medication Sig Dispense Refill    amoxicillin clavulanate 875-125 MG Oral Tab Take 1 tablet by mouth 2 (two) times daily. 14 tablet 0    mupirocin 2 % External Ointment Apply a small amount to the affected nail edge twice daily after soaking and cover with a Band-Aid, 30 g 0      Past Medical History:    Allergic rhinitis      History reviewed. No pertinent surgical history.   History reviewed. No pertinent family history.   Social History     Socioeconomic History    Marital status: Single   Tobacco Use    Smoking status: Never    Smokeless tobacco: Never   Vaping Use    Vaping status: Never Used   Substance and Sexual Activity    Alcohol use: Yes     Comment: socially    Drug use: Never           REVIEW OF SYSTEMS:   Today reviewed systens as documented below  GENERAL HEALTH: feels well otherwise  SKIN: Refer to exam below  RESPIRATORY: denies shortness of breath with exertion  CARDIOVASCULAR: denies chest pain on exertion  GI: denies abdominal pain and denies heartburn  NEURO: denies headaches    EXAM:   /70 (BP Location: Right arm, Patient Position: Sitting, Cuff Size: adult)   GENERAL: well developed, well nourished, in no apparent distress  EXTREMITIES:   1. Integument: Skin on the left foot was evaluated is warm and dry there is erythema edema drainage is noted at the lateral nail border of his left hallux and it is very painful even to light  palpation.   2. Vascular: Patient has palpable pulses both dorsalis pedis posterior tibial on the left capillary turn to the digits is immediate   3. Neurologic: Patient has intact sensorium   4. Musculoskeletal: Has good muscle strength all muscle groups fire 5 out of 5 against resistance.    ASSESSMENT AND PLAN:   Diagnoses and all orders for this visit:    Paronychia of toe of right foot    Pain of toe of right foot    Other orders  -     amoxicillin clavulanate 875-125 MG Oral Tab; Take 1 tablet by mouth 2 (two) times daily.  -     mupirocin 2 % External Ointment; Apply a small amount to the affected nail edge twice daily after soaking and cover with a Band-Aid,        Plan: Today consented the patient for an onychoplasty of the lateral nail border the left hallux utilizing a phenol and alcohol technique.  The procedure was explained possible complications and risks as well as the benefits were all reviewed questions were answered the patient wished to proceed and the consent was signed.    Preprocedure diagnosis: Paronychia lateral nail border left hallux  Post procedure diagnosis: Same  Procedure: Partial onychoplasty utilizing phenol and alcohol technique lateral nail border left hallux    Technique: Appropriate timeout was taken.  The left hallux was anesthetized with 5 cc of 0.5% Marcaine plain then prepped and draped using usual aseptic technique.  Hemostasis was achieved at the base of the toe with a toe tourniquet.  The lateral nail border of the left hallux was removed using appropriate technique.  The nail matrix area was curetted to the see if there were any remaining spicules and none were noted.  The nail matrix then received 2 consecutive, 60 seconds, applications of full strength phenol using Pedinol phenol sticks.  All areas that received phenol were then flushed with copious amounts of alcohol.  The affected toes were dressed with antibiotic ointment gauze and a lightly applied Coban wrap for  compression.  Patient was placed on antibiotics for period of 1 week will begin warm soapy soaks tomorrow instructions dispensed follow-up in 1 week    The patient indicates understanding of these issues and agrees to the plan.    Hemal Mendes DPM

## 2024-11-21 ENCOUNTER — OFFICE VISIT (OUTPATIENT)
Dept: PODIATRY CLINIC | Facility: CLINIC | Age: 24
End: 2024-11-21

## 2024-11-21 DIAGNOSIS — Z98.890 STATUS POST NAIL SURGERY: Primary | ICD-10-CM

## 2024-11-21 PROCEDURE — 99212 OFFICE O/P EST SF 10 MIN: CPT | Performed by: PODIATRIST

## 2024-11-30 NOTE — PROGRESS NOTES
Kimani Ambrosio is a 24 year old male.   Chief Complaint   Patient presents with    Follow - Up     Left hallux- rates pain 2/10 when putting pressure - a little bit purple-          HPI:   For follow-up on the left hallux still has little discomfort when putting pressure on it.  At today's visit reviewed nurse's history as taken above, allergies medications and medical history as documented below.  All changes duly noted  Allergies: Patient has no known allergies.   Current Outpatient Medications   Medication Sig Dispense Refill    amoxicillin clavulanate 875-125 MG Oral Tab Take 1 tablet by mouth 2 (two) times daily. 14 tablet 0    mupirocin 2 % External Ointment Apply a small amount to the affected nail edge twice daily after soaking and cover with a Band-Aid, 30 g 0      Past Medical History:    Allergic rhinitis      History reviewed. No pertinent surgical history.   History reviewed. No pertinent family history.   Social History     Socioeconomic History    Marital status: Single   Tobacco Use    Smoking status: Never    Smokeless tobacco: Never   Vaping Use    Vaping status: Never Used   Substance and Sexual Activity    Alcohol use: Yes     Comment: socially    Drug use: Never           REVIEW OF SYSTEMS:   Today reviewed systens as documented below  GENERAL HEALTH: feels well otherwise  SKIN: Refer to exam below  RESPIRATORY: denies shortness of breath with exertion  CARDIOVASCULAR: denies chest pain on exertion  GI: denies abdominal pain and denies heartburn  NEURO: denies headaches    EXAM:   There were no vitals taken for this visit.  GENERAL: well developed, well nourished, in no apparent distress  EXTREMITIES:   The lateral nail border of the left hallux is healing uneventfully just minimal drainage is noted.  Band-Aid and antibiotic ointment applied  ASSESSMENT AND PLAN:   Diagnoses and all orders for this visit:    Paronychia of toe of left foot    Pain of toe of left foot    Ingrowing toenail  [L60.0]        Plan: Patient will continue with soaking and application of antibiotic ointment and a Band-Aid until all drainage stops.  Follow-up as needed.    The patient indicates understanding of these issues and agrees to the plan.    Hemal Mendes DPM

## 2024-12-09 ENCOUNTER — OFFICE VISIT (OUTPATIENT)
Dept: INTERNAL MEDICINE CLINIC | Facility: CLINIC | Age: 24
End: 2024-12-09

## 2024-12-09 VITALS
TEMPERATURE: 98 F | HEART RATE: 86 BPM | WEIGHT: 196 LBS | DIASTOLIC BLOOD PRESSURE: 76 MMHG | OXYGEN SATURATION: 98 % | SYSTOLIC BLOOD PRESSURE: 122 MMHG | BODY MASS INDEX: 29.7 KG/M2 | HEIGHT: 68 IN

## 2024-12-09 DIAGNOSIS — R05.2 SUBACUTE COUGH: Primary | ICD-10-CM

## 2024-12-09 DIAGNOSIS — R09.82 PND (POST-NASAL DRIP): ICD-10-CM

## 2024-12-09 DIAGNOSIS — J01.00 ACUTE NON-RECURRENT MAXILLARY SINUSITIS: ICD-10-CM

## 2024-12-09 DIAGNOSIS — H69.93 DYSFUNCTION OF BOTH EUSTACHIAN TUBES: ICD-10-CM

## 2024-12-09 DIAGNOSIS — J30.2 SEASONAL ALLERGIC RHINITIS, UNSPECIFIED TRIGGER: ICD-10-CM

## 2024-12-09 PROCEDURE — 99214 OFFICE O/P EST MOD 30 MIN: CPT | Performed by: INTERNAL MEDICINE

## 2024-12-09 RX ORDER — FLUTICASONE PROPIONATE 50 MCG
2 SPRAY, SUSPENSION (ML) NASAL 2 TIMES DAILY
Qty: 1 EACH | Refills: 5 | Status: SHIPPED | OUTPATIENT
Start: 2024-12-09

## 2024-12-09 RX ORDER — AZITHROMYCIN 250 MG/1
TABLET, FILM COATED ORAL
Qty: 6 TABLET | Refills: 0 | Status: SHIPPED | OUTPATIENT
Start: 2024-12-09 | End: 2024-12-14

## 2024-12-09 NOTE — PATIENT INSTRUCTIONS
1. Subacute cough  I like the idea of trying to approach this with clearing the source of the problem I do think the postnasal drip eustachian tube dysfunction, and the sinuses could be an issue here, see below    2. Acute non-recurrent maxillary sinusitis  I like the idea of clearing the antibiotic as well  - azithromycin (ZITHROMAX Z-CHRISTIANA) 250 MG Oral Tab; Take 2 tablets (500 mg total) by mouth daily for 1 day, THEN 1 tablet (250 mg total) daily for 4 days.  Dispense: 6 tablet; Refill: 0  - ENT Referral - In Network    3. PND (post-nasal drip)  I like the idea of you thinking about seeing the ENT doctor if things continue to be a problem, or your tonsils continue to be a problem it is always an idea name and numbers on the front page  - ENT Referral - In Network    4. Seasonal allergic rhinitis, unspecified trigger  I do like the idea of the second-generation antihistamine, Claritin/Zyrtec/Allegra, 1 of these options in a non D way can certainly help for the long-term control stay on 1 of those  Use the nasal spray and the technique we discussed as well twice a day during rough times, just at night for other times for maintenance  - ENT Referral - In Network  - fluticasone propionate 50 MCG/ACT Nasal Suspension; 2 sprays by Each Nare route 2 (two) times daily.  Dispense: 1 each; Refill: 5    5. Dysfunction of both eustachian tubes  As above.    
yes

## 2024-12-09 NOTE — PROGRESS NOTES
HPI:   Kimani Ambrosio is a 24 year old male who presents for complains of:   Chief Complaint   Patient presents with    Checkup     Dry cough x2 wks (girlfriend recently has PNA) & Post nasal drip, itching & redness in ears     Cough and nasal congestion: Patient has cough and nasal congestion for approximately 2 weeks, claims the allergies have bothered him a bit as well.  He also has history of enlarged tonsils, he has used various allergy medicines, does not like using nasal spray.  We did discuss technique at length.  He denies any fever and chills, has had some sick contact with his significant other who had pneumonia last week, viral illness as well, he is not having any fever or chills, no headaches, claims his ears do itch at times.    EXAM:   /76   Pulse 86   Temp 98.2 °F (36.8 °C) (Oral)   Ht 5' 8\" (1.727 m)   Wt 196 lb (88.9 kg)   SpO2 98%   BMI 29.80 kg/m²   Body mass index is 29.8 kg/m².   Gen. exam: Alert and oriented, in no acute distress   HEENT: Pupils equal and reactive to light and accommodation, moist mucous membranes, left tympanic membrane with erythema no bulge, no fluid.  Patient does have moderately enlarged nasal turbinates, productive mottled in appearance, postnasal drip noted in the pharynx, no nodular erythema, no sublingual submandibular lymphadenopathy.  Neck exam:  Supple.  Normal thyroid trachea midline, no JVD  Heart exam: Regular rate and rhythm no murmurs no S3 no S4   Lung exam: No rales no rhonchi no wheezes  Abdominal exam: Soft, nontender, nondistended, positive bowel sounds are normoactive  Extremities exam: no clubbing, no cyanosis, no edema  Skin exam: No obvious wounds, no rashes  Neurological exam: Cranial nerves II through XII intact, no gross deficits  Musculoskeletal exam: no arthritis appreciated, no obvious deformity    ASSESSMENT AND PLAN:   Kimani Ambrosio is a 24 year old male who presents with the followin. Subacute cough  I like the idea of  trying to approach this with clearing the source of the problem I do think the postnasal drip eustachian tube dysfunction, and the sinuses could be an issue here, see below    2. Acute non-recurrent maxillary sinusitis  I like the idea of clearing the antibiotic as well  - azithromycin (ZITHROMAX Z-CHRISTIANA) 250 MG Oral Tab; Take 2 tablets (500 mg total) by mouth daily for 1 day, THEN 1 tablet (250 mg total) daily for 4 days.  Dispense: 6 tablet; Refill: 0  - ENT Referral - In Network    3. PND (post-nasal drip)  I like the idea of you thinking about seeing the ENT doctor if things continue to be a problem, or your tonsils continue to be a problem it is always an idea name and numbers on the front page  - ENT Referral - In Network    4. Seasonal allergic rhinitis, unspecified trigger  I do like the idea of the second-generation antihistamine, Claritin/Zyrtec/Allegra, 1 of these options in a non D way can certainly help for the long-term control stay on 1 of those  Use the nasal spray and the technique we discussed as well twice a day during rough times, just at night for other times for maintenance  - ENT Referral - In Network  - fluticasone propionate 50 MCG/ACT Nasal Suspension; 2 sprays by Each Nare route 2 (two) times daily.  Dispense: 1 each; Refill: 5    5. Dysfunction of both eustachian tubes  As above.        Spent 30 minutes obtaining history, evaluating patient, discussing treatment options, diet, exercise, review of available labs and radiology reports, and completing documentation.    Jeff Anthony D'Amico,   12/9/2024  2:07 PM

## 2025-01-07 ENCOUNTER — TELEPHONE (OUTPATIENT)
Dept: INTERNAL MEDICINE CLINIC | Facility: CLINIC | Age: 25
End: 2025-01-07

## 2025-01-07 NOTE — TELEPHONE ENCOUNTER
Called pt. And LMOM to call back.  He scheduled an appt through [facility] to see Dr. D'Amico on 2/10/25 for fever, chills, runny nose and headache.

## 2025-01-15 ENCOUNTER — TELEPHONE (OUTPATIENT)
Dept: INTERNAL MEDICINE CLINIC | Facility: CLINIC | Age: 25
End: 2025-01-15

## 2025-01-15 NOTE — TELEPHONE ENCOUNTER
Patient left the following voicemail message  Requests prescription for a muscle relaxant  This morning while stretching patient's back went out - experiencing severe muscle spasms   Pain level 11 on a scale of 1 to 10  OTC Aleve is not helping    Call back # [phone]

## 2025-01-15 NOTE — TELEPHONE ENCOUNTER
Called patient who was stretching and pulled something in his back Pain @ 11 - RN instructed patient that he needs  to be evaluated in UC - verbalized understanding F/U 1/16

## 2025-01-24 NOTE — TELEPHONE ENCOUNTER
Left message to call back for update on patient's condition. Did he go to urgent care?    See also the 1/7/25 encounter and ask patient about appt he has scheduled for 2/10/25. Appt was scheduled through [facility] is for \"I have had fever like symptoms for the last four days I had chills, runny nose & headache\"

## (undated) NOTE — Clinical Note
Dear Hipolito,  I had the opportunity to see your patient Kimani Ambrosio recently. I appreciate your confidence in me to care for your patients. Please feel free call me with any questions at [phone] or contact me through Epic.  Sincerely, Lloyd Mack MD Board Certified, Physical Medicine and Rehabilitation Specializing in Sports Medicine, Spine Medicine and Electrodiagnostic Medicine [facility]

## (undated) NOTE — LETTER
2/5/2025          Kimani Ambrosio        [address] [place] [address]         Dear Kimani,    This letter is to inform you that our office has made several attempts to reach you by phone without success.  We were attempting to contact you by phone regarding your call from 1/15/25  For muscle spasms    Please contact our office at the number listed below as soon as you receive this letter to discuss this issue and to make the necessary changes in our system to your contact information.  Thank you for your cooperation.        Sincerely,    Jeff Anthony D'Amico, DO  [facility] [place], [place] [address] [address] [facility] [address]  [phone]

## (undated) NOTE — LETTER
WHERE IS YOUR PAIN NOW?  Qamar the areas on your body where you feel the described sensations.  Use the appropriate symbol.  Qamar the areas of radiation.  Include all affected areas.  Just to complete the picture, please draw in the face.     ACHE:  ^ ^ ^   NUMBNESS:  0000   PINS & NEEDLES:  = = = =                              ^ ^ ^                       0000              = = = =                                    ^ ^ ^                       0000            = = = =      BURNING:  XXXX   STABBING: ////                  XXXX                ////                         XXXX          ////     Please qamar the line below indicating your degree of pain right now  with 0 being no pain 10 being the worst pain possible.                                         0             1             2              3             4              5              6              7             8             9             10         Patient Signature:

## (undated) NOTE — LETTER
AUTHORIZATION FOR SURGICAL OPERATION OR OTHER PROCEDURE    1. I hereby authorize Dr. Hemal Mendes, and [facility] staff assigned to my case to perform the following operation and/or procedure at the [facility] site:    ______________________________________________________________________________________________    PNA left Hallux Lateral Border  _______________________________________________________________________________________________    2.  My physician has explained the nature and purpose of the operation or other procedure, possible alternative methods of treatment, the risks involved, and the possibility of complication to me.  I acknowledge that no guarantee has been made as to the result that may be obtained.  3.  I recognize that, during the course of this operation, or other procedure, unforseen conditions may necessitate additional or different procedure than those listed above.  I, therefore, further authorize and request that the above named physician, his/her physician assistants or designees perform such procedures as are, in his/her professional opinion, necessary and desirable.  4.  Any tissue or organs removed in the operation or other procedure may be disposed of by and at the discretion of the [facility] and [facility].  5.  I understand that in the event of a medical emergency, I will be transported by local paramedics to [facility] or other hospital emergency department.  6.  I certify that I have read and fully understand the above consent to operation and/or other procedure.    7.  I acknowledge that my physician has explained sedation/analgesia administration to me including the risks and benefits.  I consent to the administration of sedation/analgesia as may be necessary or desirable in the judgement of my physician.    Witness signature: ___________________________________________________ Date:   ______/______/_____                    Time:  ________ A.M.  P.M.       Patient Name:  ______________________________________________________  (please print)      Patient signature:  ___________________________________________________             Relationship to Patient:           []  Parent    Responsible person                          []  Spouse  In case of minor or                    [] Other  _____________   Incompetent name:  __________________________________________________                               (please print)      _____________      Responsible person  In case of minor or  Incompetent signature:  _______________________________________________    Statement of Physician  My signature below affirms that prior to the time of the procedure, I have explained to the patient and/or his/her guardian, the risks and benefits involved in the proposed treatment and any reasonable alternative to the proposed treatment.  I have also explained the risks and benefits involved in the refusal of the proposed treatment and have answered the patient's questions.                        Date:  ______/______/_______  Provider                      Signature:  __________________________________________________________       Time:  ___________ A.M    P.M.